# Patient Record
Sex: FEMALE | Race: WHITE | NOT HISPANIC OR LATINO | Employment: UNEMPLOYED | URBAN - METROPOLITAN AREA
[De-identification: names, ages, dates, MRNs, and addresses within clinical notes are randomized per-mention and may not be internally consistent; named-entity substitution may affect disease eponyms.]

---

## 2020-01-21 PROBLEM — E55.9 VITAMIN D DEFICIENCY: Status: ACTIVE | Noted: 2019-11-15

## 2020-01-21 PROBLEM — M54.50 CHRONIC BILATERAL LOW BACK PAIN WITHOUT SCIATICA: Status: ACTIVE | Noted: 2017-11-14

## 2020-01-21 PROBLEM — I10 ESSENTIAL HYPERTENSION WITH GOAL BLOOD PRESSURE LESS THAN 130/85: Status: ACTIVE | Noted: 2017-11-14

## 2020-01-21 PROBLEM — F41.1 GENERALIZED ANXIETY DISORDER: Status: ACTIVE | Noted: 2019-11-15

## 2020-01-21 PROBLEM — G89.29 CHRONIC BILATERAL LOW BACK PAIN WITHOUT SCIATICA: Status: ACTIVE | Noted: 2017-11-14

## 2020-01-21 PROBLEM — M05.79 RHEUMATOID ARTHRITIS INVOLVING MULTIPLE SITES WITH POSITIVE RHEUMATOID FACTOR: Status: ACTIVE | Noted: 2018-04-03

## 2020-01-21 PROBLEM — K21.00 GASTROESOPHAGEAL REFLUX DISEASE WITH ESOPHAGITIS: Status: ACTIVE | Noted: 2017-11-14

## 2020-01-21 PROBLEM — Z86.010 HISTORY OF COLON POLYPS: Status: ACTIVE | Noted: 2018-03-14

## 2020-01-21 PROBLEM — L40.9 PSORIASIS: Status: ACTIVE | Noted: 2017-11-14

## 2020-01-21 PROBLEM — E78.5 DYSLIPIDEMIA: Status: ACTIVE | Noted: 2017-11-14

## 2020-01-21 PROBLEM — G47.00 INSOMNIA: Status: ACTIVE | Noted: 2019-11-15

## 2020-01-22 PROBLEM — F51.01 PRIMARY INSOMNIA: Status: ACTIVE | Noted: 2019-11-15

## 2020-05-05 PROBLEM — F33.41 RECURRENT MAJOR DEPRESSIVE DISORDER, IN PARTIAL REMISSION: Status: ACTIVE | Noted: 2020-05-05

## 2021-05-12 ENCOUNTER — PATIENT MESSAGE (OUTPATIENT)
Dept: RESEARCH | Facility: HOSPITAL | Age: 66
End: 2021-05-12

## 2021-12-21 ENCOUNTER — PATIENT OUTREACH (OUTPATIENT)
Dept: ADMINISTRATIVE | Facility: HOSPITAL | Age: 66
End: 2021-12-21
Payer: MEDICARE

## 2022-05-22 PROBLEM — I10 ESSENTIAL (PRIMARY) HYPERTENSION: Status: ACTIVE | Noted: 2022-05-22

## 2022-05-22 PROBLEM — E87.1 HYPONATREMIA: Status: ACTIVE | Noted: 2022-05-22

## 2022-05-22 PROBLEM — E87.6 HYPOKALEMIA: Status: ACTIVE | Noted: 2022-05-22

## 2022-05-22 PROBLEM — N10 ACUTE PYELONEPHRITIS: Status: ACTIVE | Noted: 2022-05-22

## 2022-05-22 PROBLEM — K21.9 GERD (GASTROESOPHAGEAL REFLUX DISEASE): Status: ACTIVE | Noted: 2022-05-22

## 2022-05-22 PROBLEM — R91.8 ABNORMALITY OF LUNG ON CXR: Status: ACTIVE | Noted: 2022-05-22

## 2022-05-23 PROBLEM — R19.7 DIARRHEA: Status: ACTIVE | Noted: 2022-05-23

## 2022-05-23 PROBLEM — N39.0 UTI (URINARY TRACT INFECTION): Status: ACTIVE | Noted: 2022-05-22

## 2022-05-24 PROBLEM — F41.9 ANXIETY: Status: RESOLVED | Noted: 2022-05-24 | Resolved: 2022-05-24

## 2022-05-24 PROBLEM — J18.9 PNEUMONIA: Status: ACTIVE | Noted: 2022-05-24

## 2022-05-24 PROBLEM — J96.01 ACUTE HYPOXEMIC RESPIRATORY FAILURE: Status: ACTIVE | Noted: 2022-05-24

## 2022-05-24 PROBLEM — E87.20 METABOLIC ACIDOSIS: Status: ACTIVE | Noted: 2022-05-24

## 2022-05-24 PROBLEM — R00.0 TACHYCARDIA: Status: ACTIVE | Noted: 2022-05-24

## 2022-05-24 PROBLEM — I50.20 HFREF (HEART FAILURE WITH REDUCED EJECTION FRACTION): Status: ACTIVE | Noted: 2022-05-24

## 2022-05-24 PROBLEM — E83.39 HYPOPHOSPHATEMIA: Status: ACTIVE | Noted: 2022-05-24

## 2022-05-24 PROBLEM — K21.9 GERD (GASTROESOPHAGEAL REFLUX DISEASE): Status: ACTIVE | Noted: 2017-11-14

## 2022-05-24 PROBLEM — J90 BILATERAL PLEURAL EFFUSION: Status: ACTIVE | Noted: 2022-05-24

## 2022-05-24 PROBLEM — F41.9 ANXIETY: Status: ACTIVE | Noted: 2022-05-24

## 2022-05-26 PROBLEM — N17.9 AKI (ACUTE KIDNEY INJURY): Status: ACTIVE | Noted: 2022-05-26

## 2022-05-26 PROBLEM — E83.39 HYPOPHOSPHATEMIA: Status: RESOLVED | Noted: 2022-05-24 | Resolved: 2022-05-26

## 2022-05-27 PROBLEM — N39.0 UTI (URINARY TRACT INFECTION): Status: RESOLVED | Noted: 2022-05-22 | Resolved: 2022-05-27

## 2022-05-27 PROBLEM — R74.01 TRANSAMINITIS: Status: ACTIVE | Noted: 2022-05-27

## 2022-05-27 PROBLEM — E87.20 METABOLIC ACIDOSIS: Status: RESOLVED | Noted: 2022-05-24 | Resolved: 2022-05-27

## 2022-05-29 PROBLEM — J96.01 ACUTE HYPOXEMIC RESPIRATORY FAILURE: Status: RESOLVED | Noted: 2022-05-24 | Resolved: 2022-05-29

## 2022-05-29 PROBLEM — E87.6 HYPOKALEMIA: Status: RESOLVED | Noted: 2022-05-22 | Resolved: 2022-05-29

## 2022-06-01 ENCOUNTER — PATIENT OUTREACH (OUTPATIENT)
Dept: ADMINISTRATIVE | Facility: CLINIC | Age: 67
End: 2022-06-01
Payer: MEDICARE

## 2022-06-01 ENCOUNTER — OFFICE VISIT (OUTPATIENT)
Dept: FAMILY MEDICINE | Facility: CLINIC | Age: 67
End: 2022-06-01
Payer: MEDICARE

## 2022-06-01 ENCOUNTER — NURSE TRIAGE (OUTPATIENT)
Dept: ADMINISTRATIVE | Facility: CLINIC | Age: 67
End: 2022-06-01
Payer: MEDICARE

## 2022-06-01 DIAGNOSIS — R60.9 EDEMA, UNSPECIFIED TYPE: Primary | ICD-10-CM

## 2022-06-01 PROCEDURE — 4010F ACE/ARB THERAPY RXD/TAKEN: CPT | Mod: CPTII,95,, | Performed by: FAMILY MEDICINE

## 2022-06-01 PROCEDURE — 99213 PR OFFICE/OUTPT VISIT, EST, LEVL III, 20-29 MIN: ICD-10-PCS | Mod: 95,,, | Performed by: FAMILY MEDICINE

## 2022-06-01 PROCEDURE — 3044F PR MOST RECENT HEMOGLOBIN A1C LEVEL <7.0%: ICD-10-PCS | Mod: CPTII,95,, | Performed by: FAMILY MEDICINE

## 2022-06-01 PROCEDURE — 1111F DSCHRG MED/CURRENT MED MERGE: CPT | Mod: CPTII,95,, | Performed by: FAMILY MEDICINE

## 2022-06-01 PROCEDURE — 3044F HG A1C LEVEL LT 7.0%: CPT | Mod: CPTII,95,, | Performed by: FAMILY MEDICINE

## 2022-06-01 PROCEDURE — 99213 OFFICE O/P EST LOW 20 MIN: CPT | Mod: 95,,, | Performed by: FAMILY MEDICINE

## 2022-06-01 PROCEDURE — 4010F PR ACE/ARB THEARPY RXD/TAKEN: ICD-10-PCS | Mod: CPTII,95,, | Performed by: FAMILY MEDICINE

## 2022-06-01 PROCEDURE — 1111F PR DISCHARGE MEDS RECONCILED W/ CURRENT OUTPATIENT MED LIST: ICD-10-PCS | Mod: CPTII,95,, | Performed by: FAMILY MEDICINE

## 2022-06-01 NOTE — PROGRESS NOTES
The patient location is: Home  The chief complaint leading to consultation is:leg swelling  Visit type: Virtual visit with synchronous audio and video  Total time spent with patient: 15 minutes  Each patient to whom he or she provides medical services by telemedicine is:  (1) informed of the relationship between the physician and patient and the respective role of any other health care provider with respect to management of the patient; and (2) notified that he or she may decline to receive medical services by telemedicine and may withdraw from such care at any time.    Notes:   Brianna Araya presents with moderate bilateral leg swelling without redness or tenderness x 2 days. Yesterday hands sl swollen but this dissipated overnight. No upper respiratory congestion cough. No dyspnea Denies nausea,vomiting,diarrhea or fever.    Past Medical History:   Diagnosis Date    Acute hypoxemic respiratory failure 5/24/2022    Anxiety     Depression     GERD (gastroesophageal reflux disease)     Hyperlipidemia     Hypertension     Hypokalemia 5/22/2022     Past Surgical History:   Procedure Laterality Date    COLON SURGERY      colonoscopy    HERNIA REPAIR       Review of patient's allergies indicates:   Allergen Reactions    Amoxicillin-pot clavulanate Other (See Comments)     Causes abd pains    Atorvastatin Other (See Comments)     Makes bones hurts    Carisoprodol Other (See Comments) and Nausea Only    Darvocet a500  [propoxyphene n-acetaminophen] Other (See Comments)     Other reaction(s): Unknown    Dexamethasone (pf) Other (See Comments)    Dexamethasone sodium phosphate      Other reaction(s): stomach ache    Gemfibrozil Other (See Comments)     Other reaction(s): stomach cramping      Tramadol Other (See Comments) and Nausea And Vomiting     Current Outpatient Medications on File Prior to Visit   Medication Sig Dispense Refill    adalimumab (HUMIRA) 40 mg/0.8 mL SyKt injection INJECT 0.8ML (40MG  TOTAL) SUBCUTANEOUSLY EVERY 14 DAYS 2 each 11    amitriptyline (ELAVIL) 25 MG tablet 1/2-2 tab PO q HS prn sleep 30 tablet 11    Ca-D3-mag-zinc--clarence-boron 600 mg calcium- 800 unit-40 mg Chew 2 tab(s)      calcium carbonate (OS-CAMELIA) 600 mg calcium (1,500 mg) Tab Take 600 mg by mouth 2 (two) times daily.      cetirizine (ZYRTEC) 10 MG tablet Take 1 tablet (10 mg total) by mouth daily as needed for Allergies. 30 tablet 11    cyanocobalamin (VITAMIN B-12) 1000 MCG tablet Take 100 mcg by mouth.      cyclobenzaprine (FLEXERIL) 10 MG tablet TAKE ONE TABLET BY MOUTH THREE TIMES A DAY FOR MUSCLE SPASMS 90 tablet 3    fluticasone propionate (FLONASE) 50 mcg/actuation nasal spray 1 spray (50 mcg total) by Each Nostril route daily as needed for Allergies. 48 g 3    hydroCHLOROthiazide (HYDRODIURIL) 25 MG tablet Take 1 tablet (25 mg total) by mouth once daily. 90 tablet 3    HYDROcodone-acetaminophen (NORCO)  mg per tablet Take 1 tablet by mouth every 12 (twelve) hours as needed for Pain. 60 tablet 0    hydrocortisone 2.5 % cream Apply topically 2 (two) times daily. 28 g 5    hydrOXYzine HCL (ATARAX) 25 MG tablet Take 1 tablet (25 mg total) by mouth 3 (three) times daily as needed for Itching. 30 tablet 11    ipratropium-albuteroL (COMBIVENT)  mcg/actuation inhaler Inhale 1 puff into the lungs every 4 (four) hours as needed for Wheezing or Shortness of Breath. Rescue 4 g 1    losartan (COZAAR) 100 MG tablet TAKE 1 TABLET EVERY DAY 90 tablet 3    metoprolol succinate (TOPROL-XL) 25 MG 24 hr tablet Take 1 tablet (25 mg total) by mouth once daily. (Patient not taking: Reported on 6/1/2022) 30 tablet 1    montelukast (SINGULAIR) 10 mg tablet Take 1 tablet (10 mg total) by mouth once daily. 90 tablet 3    mupirocin (BACTROBAN) 2 % ointment Apply topically 2 (two) times daily. 22 g 5    ondansetron (ZOFRAN) 4 MG tablet TAKE ONE TABLET BY MOUTH EVERY 8 HOURS AS NEEDED FOR NAUSEA 90 tablet 3     pantoprazole (PROTONIX) 40 MG tablet Take 1 tablet (40 mg total) by mouth once daily. 90 tablet 3    pyridoxine, vitamin B6, (B-6) 100 MG Tab Take 100 mg by mouth.      rosuvastatin (CRESTOR) 10 MG tablet TAKE 1 TABLET EVERY DAY 90 tablet 3    triamcinolone (KENALOG) 0.5 % ointment Apply 1 application topically 2 (two) times daily. 15 g 11    vitamin A 8000 UNIT capsule Take 8,000 Units by mouth once daily.      vitamin D (VITAMIN D3) 1000 units Tab Take 1,000 Units by mouth.      vitamin E 1000 UNIT capsule Take 1,000 Units by mouth.       Current Facility-Administered Medications on File Prior to Visit   Medication Dose Route Frequency Provider Last Rate Last Admin    [DISCONTINUED] acetaminophen tablet 650 mg  650 mg Oral Q6H PRN Larry Roberts MD        [DISCONTINUED] dextromethorphan-guaiFENesin  mg/5 ml liquid 10 mL  10 mL Oral Q6H Lesly Thapa MD   10 mL at 05/31/22 1200    [DISCONTINUED] dextrose 10% bolus 125 mL  12.5 g Intravenous PRN Carroll León MD        [DISCONTINUED] dextrose 10% bolus 250 mL  25 g Intravenous PRN Carroll León MD        [DISCONTINUED] enoxaparin injection 30 mg  30 mg Subcutaneous Daily Jamie Alanis MD   30 mg at 05/31/22 1622    [DISCONTINUED] folic acid tablet 1 mg  1 mg Oral Daily Ezequiel Navarro NP   1 mg at 05/31/22 1013    [DISCONTINUED] glucagon (human recombinant) injection 1 mg  1 mg Intramuscular PRN Carroll León MD        [DISCONTINUED] glucose chewable tablet 16 g  16 g Oral PRN Carroll León MD        [DISCONTINUED] glucose chewable tablet 24 g  24 g Oral PRN Carroll León MD        [DISCONTINUED] haloperidol lactate injection 5 mg  5 mg Intravenous Q6H PRN Ezequiel Navarro NP        [DISCONTINUED] HYDROcodone-acetaminophen 5-325 mg per tablet 1 tablet  1 tablet Oral Q6H PRN Ezequiel Navarro NP   1 tablet at 05/31/22 1013    [DISCONTINUED] hydrOXYzine HCL tablet 10 mg  10 mg Oral TID PRN Ezequiel Navarro NP   10 mg at 05/31/22  1014    [DISCONTINUED] ipratropium-albuteroL inhaler 1 puff  1 puff Inhalation Q4H Ezequiel Navarro NP   1 puff at 05/31/22 1120    [DISCONTINUED] levoFLOXacin tablet 750 mg  750 mg Oral Every other day Jade Perea MD   750 mg at 05/31/22 1014    [DISCONTINUED] melatonin tablet 6 mg  6 mg Oral Daily PM Ezequiel Navarro NP   6 mg at 05/30/22 2024    [DISCONTINUED] metoprolol succinate (TOPROL-XL) 24 hr tablet 25 mg  25 mg Oral Daily Jaya Khan MD   25 mg at 05/30/22 0915    [DISCONTINUED] naloxone 0.4 mg/mL injection 0.02 mg  0.02 mg Intravenous PRN Carroll León MD        [DISCONTINUED] ondansetron injection 4 mg  4 mg Intravenous Q8H PRN Carroll León MD   4 mg at 05/28/22 1512    [DISCONTINUED] pantoprazole EC tablet 40 mg  40 mg Oral Daily Carroll León MD   40 mg at 05/31/22 1013    [DISCONTINUED] sodium chloride 0.9% flush 10 mL  10 mL Intravenous Q12H PRN Carroll León MD         Social History     Socioeconomic History    Marital status: Legally     Number of children: 2   Tobacco Use    Smoking status: Former Smoker    Smokeless tobacco: Never Used    Tobacco comment: quit 35 years ago   Substance and Sexual Activity    Alcohol use: Yes     Alcohol/week: 3.0 standard drinks     Types: 3 Cans of beer per week    Drug use: Never    Sexual activity: Not Currently     Family History   Problem Relation Age of Onset    Diabetes Mother     Dementia Mother     Liver disease Father     Alcohol abuse Father     No Known Problems Son          ROS:  SKIN: No rashes, itching or changes in color or texture of skin.  EYES: Visual acuity fine. No photophobia, ocular pain or diplopia.EARS: Denies ear pain, discharge or vertigo.NOSE: No loss of smell, no epistaxis some postnasal drip.MOUTH & THROAT: No hoarseness or change in voice. No excessive gum bleeding.CHEST: Denies JUNIOR, cyanosis, wheezing  CARDIOVASCULAR: Denies chest pain, PND, orthopnea or reduced exercise  tolerance.  ABDOMEN:  No weight loss.No abdominal pain, no hematemesis or blood in stool.  URINARY: No flank pain, dysuria or hematuria.  PERIPHERAL VASCULAR: No claudication or cyanosis. Edema bilateral legs  MUSCULOSKELETAL: Negative   NEUROLOGIC: No history of seizures, paralysis, alteration of gait or coordination.    PE: Heent:Normocephalic with no recent cranial trauma,PERRLA,EOMI,conjunctiva clear   Chest:No tachypnea. No wheezing, rhonchi on forced expiration  Abdomen:Soft, non tender to patient palpation  Ext: 2/2 edema below knees and distal, no hand edema  Diagnoses and all orders for this visit:    Edema, unspecified type    Add HCTZ 25 mg today and elevate feet-if not better tomorrow rec OV  Answers for HPI/ROS submitted by the patient on 6/1/2022  activity change: No  unexpected weight change: Yes  neck pain: No  hearing loss: No  rhinorrhea: No  trouble swallowing: No  eye discharge: No  chest tightness: No  wheezing: No  chest pain: No  palpitations: No  constipation: No  vomiting: No  diarrhea: No  difficulty urinating: No  hematuria: No  headaches: No  dysphoric mood: No

## 2022-06-01 NOTE — TELEPHONE ENCOUNTER
Transferred from [facility] from post-discharge    Discharged yesterday from [facility], admitted for pneumonia and other comorbidities. Now legs feel like more swollen than normal and also pt requesting PT. Denies SOB. Pt sates able to ambulate. Elevation seems to make swelling worse.    Has June 22 appt with PCP Dr. Guerrero.     Scheduled VV per request.     Reason for Disposition   MILD swelling of both ankles (i.e., pedal edema) AND new-onset or worsening    Additional Information   Negative: Sounds like a life-threatening emergency to the triager   Negative: Difficulty breathing at rest   Negative: Entire foot is cool or blue in comparison to other side   Negative: SEVERE swelling (e.g., swelling extends above knee, entire leg is swollen, weeping fluid)   Negative: Thigh or calf pain and only 1 side and present > 1 hour   Negative: Thigh, calf, or ankle swelling in only one leg   Negative: Thigh, calf, or ankle swelling in both legs, but one side is definitely more swollen (Exception: longstanding difference between legs)   Negative: Cast on leg or ankle and has increasing pain   Negative: Can't walk or can barely stand (new-onset)   Negative: Fever and red area (or area very tender to touch)   Negative: Patient sounds very sick or weak to the triager   Negative: Swelling of face, arm or hands (Exception: slight puffiness of fingers during hot weather)   Negative: Pregnant 20 or more weeks and sudden weight gain (i.e., > 2 lbs, 1 kg in one week)   Negative: MODERATE swelling of both ankles (e.g., swelling extends up to the knees) AND new-onset or worsening   Negative: Difficulty breathing with exertion AND worsening or new-onset   Negative: Looks like a boil, infected sore, deep ulcer, or other infected rash (spreading redness, pus)   Negative: Patient wants to be seen    Protocols used: LEG SWELLING AND EDEMA-A-OH

## 2022-06-01 NOTE — PROGRESS NOTES
C3 nurse spoke with Brianna Araya   for a TCC post hospital discharge follow up call. The patient has a scheduled HOSFU appointment with Vic Guerrero MD   on 6/7/22 @ 1030.

## 2022-06-01 NOTE — PROGRESS NOTES
"C3 nurse attempted to contact Brianna Araya   for a TCC post hospital discharge follow up call. The patient is unable to conduct the call @ this time. The patient was sent to triage due to c/o BLE "swelling like never before"    The patient does not have a scheduled HOSFU appointment within 7-14 days post hospital discharge date 5/31/22. Message sent to Physician staff to assist with HOSFU appointment scheduling.      "

## 2023-08-22 PROBLEM — M94.0 ACUTE COSTOCHONDRITIS: Status: ACTIVE | Noted: 2023-08-22
